# Patient Record
Sex: FEMALE | Race: WHITE | NOT HISPANIC OR LATINO
[De-identification: names, ages, dates, MRNs, and addresses within clinical notes are randomized per-mention and may not be internally consistent; named-entity substitution may affect disease eponyms.]

---

## 2021-09-22 PROBLEM — Z00.00 ENCOUNTER FOR PREVENTIVE HEALTH EXAMINATION: Status: ACTIVE | Noted: 2021-09-22

## 2021-10-11 ENCOUNTER — NON-APPOINTMENT (OUTPATIENT)
Age: 41
End: 2021-10-11

## 2021-10-21 ENCOUNTER — APPOINTMENT (OUTPATIENT)
Dept: ENDOCRINOLOGY | Facility: CLINIC | Age: 41
End: 2021-10-21
Payer: COMMERCIAL

## 2021-10-21 ENCOUNTER — NON-APPOINTMENT (OUTPATIENT)
Age: 41
End: 2021-10-21

## 2021-10-21 VITALS
HEART RATE: 98 BPM | HEIGHT: 66 IN | SYSTOLIC BLOOD PRESSURE: 129 MMHG | DIASTOLIC BLOOD PRESSURE: 86 MMHG | BODY MASS INDEX: 29.73 KG/M2 | WEIGHT: 185 LBS

## 2021-10-21 DIAGNOSIS — Z78.9 OTHER SPECIFIED HEALTH STATUS: ICD-10-CM

## 2021-10-21 DIAGNOSIS — Z83.3 FAMILY HISTORY OF DIABETES MELLITUS: ICD-10-CM

## 2021-10-21 DIAGNOSIS — E11.9 TYPE 2 DIABETES MELLITUS W/OUT COMPLICATIONS: ICD-10-CM

## 2021-10-21 PROCEDURE — 99204 OFFICE O/P NEW MOD 45 MIN: CPT

## 2021-10-21 RX ORDER — ETHYNODIOL DIACETATE AND ETHINYL ESTRADIOL TABLETS 1 MG-35MCG
1-35 KIT ORAL
Refills: 0 | Status: ACTIVE | COMMUNITY

## 2021-10-22 NOTE — HISTORY OF PRESENT ILLNESS
[FreeTextEntry1] : Diagnosed with diabetes by her PCP in August but has not changed her diet since learning of diagnosis, says she was waiting for this appointment.\par 10 years ago, she was told her sugars were borderline and was recommended to start testing sugars at home but she "freaked out"\par no polyuria, polydipsia, blurry vision, SOB, chest pain, or neuropathy symptoms \par Lost 15 lb\par Eats high carb diet -- bagel every morning, pasta, fruit, pizza\par Father has pre diabetes and paternal aunt and cousins have diabetes \par She has increased her walking.\par has seen ophtho who recommended she be tested for diabetes because she was having corneal abrasions.\par vaccinated for Covid.  declines flu vaccine today, says she is vegetarian and vaccine is made with chicken eggs.\par not planning pregnancy\par Was prescribed metformin in the past but it made her very gassy and she could not take it while working\par on estrogen/progestin pill since age 15.  had very irregular periods.  ? PCOS but never received that diagnosis.\par labs reviewed from 8/21:  A1c 11.3%\par \par Meds:\par Zovia estrogen/progestin pill

## 2021-10-22 NOTE — PHYSICAL EXAM
[Alert] : alert [No Acute Distress] : no acute distress [No Proptosis] : no proptosis [No Lid Lag] : no lid lag [Normal Hearing] : hearing was normal [No LAD] : no lymphadenopathy [Thyroid Not Enlarged] : the thyroid was not enlarged [Clear to Auscultation] : lungs were clear to auscultation bilaterally [Normal S1, S2] : normal S1 and S2 [Regular Rhythm] : with a regular rhythm [No Edema] : no peripheral edema [Pedal Pulses Normal] : the pedal pulses are present [Normal Sensation on Monofilament Testing] : normal sensation on monofilament testing of lower extremities [Normal Affect] : the affect was normal [Normal Mood] : the mood was normal [de-identified] : fingerstick 488 (bagel for breakfast)

## 2021-10-22 NOTE — REASON FOR VISIT
[Initial Evaluation] : an initial evaluation [DM Type 2] : DM Type 2 [FreeTextEntry2] : Dr Josey Gudino

## 2021-10-22 NOTE — CONSULT LETTER
[Dear  ___] : Dear  [unfilled], [Consult Letter:] : I had the pleasure of evaluating your patient, [unfilled]. [Please see my note below.] : Please see my note below. [Consult Closing:] : Thank you very much for allowing me to participate in the care of this patient.  If you have any questions, please do not hesitate to contact me. [Sincerely,] : Sincerely, [FreeTextEntry1] : Ms. Amaya has uncontrolled diabetes.  I started her on metformin ER and Jardiance and recommended that she reduce carbs in her diet.  I gave her referral to nutritionist.  I also set her up with Kat continuous glucose monitor to monitor her sugars (she said she doesn't like needles, would not prick herself).\par  [FreeTextEntry3] : Chasity Martinez MD\par Division of Endocrinology\par [facility]

## 2021-10-22 NOTE — ASSESSMENT
[FreeTextEntry1] : Diabetes, not controlled.  goal A1c  < 7%.  Obesity BMI 30.\par Potential complications of diabetes reviewed (blindness, kidney disease, nerve damage) and importance of glucose control discussed to prevent complications. Refer to nutritionist. Limit carb portions to one type of carb per meal and limiit  carbs to 45g per meal. Foot care discussed.\par Explained that her weight loss is probably due to uncontrolled diabetes/hyperglycemia and this is not a healthy weight loss and she may gain back to her baseline as sugars are normalized.\par Kat 2 sensor placed on L arm.  Video of how to apply sensor viewed.  Glucose goals reviewed:  am/premeal goal < 130 and 2h post meal goal < 170.  \par Start metformin ER 500mg, 1 tab with food/day  and in two weeks, increase to 2 tab/day, if not having GI side effects.  Also will add Jardiance 10mg/day. \par 12 units Lyumjev given in office to treat hyperglycemia.\par \par Hyperlipidemia.  will recheck lipids once she has made diet changes, though I explained most people with diabetes will need cholesterol medication.  But my first goal is to control her sugars\par RTO 2 months

## 2021-12-20 ENCOUNTER — TRANSCRIPTION ENCOUNTER (OUTPATIENT)
Age: 41
End: 2021-12-20

## 2022-02-04 ENCOUNTER — APPOINTMENT (OUTPATIENT)
Dept: ENDOCRINOLOGY | Facility: CLINIC | Age: 42
End: 2022-02-04
Payer: COMMERCIAL

## 2022-02-04 DIAGNOSIS — E66.9 OBESITY, UNSPECIFIED: ICD-10-CM

## 2022-02-04 PROCEDURE — 99213 OFFICE O/P EST LOW 20 MIN: CPT | Mod: 95

## 2022-02-04 NOTE — ASSESSMENT
[FreeTextEntry1] : Diabetes, sugars improved.  goal A1c  < 7%.  Obesity.\par Continue metformin, titrate to 3 tab/day.  Continue Jardiance\par also will add bupropion to reduce food cravings and emotional eating.  Start with 1 tab hs and then increase to BID after 2 weeks, as tolerated\par labs ordered; she will come next week to recheck A1c, B12\par RTO 3 months

## 2022-02-04 NOTE — REASON FOR VISIT
[Home] : at home, [unfilled] , at the time of the visit. [Medical Office: ([facility])___] : at the medical office located in  [Verbal consent obtained from patient] : the patient, [unfilled] [Follow - Up] : a follow-up visit

## 2022-02-04 NOTE — HISTORY OF PRESENT ILLNESS
[FreeTextEntry1] : Sugars are better, 100-180 mostly.   \par Wearing Kat on and off.   right now, glucose is 171 on Kat. \par 7 d avg is 192, 14d avg 192.\par was stress eating due to her job so then was not testing on Kat.\par has appointment to see RD in 2 weeks.  needs better food snack options and how to cook in small kitchen.\par feels more tired and more foggy, and thinks it may be due to Jardiance\par not urinating more than usual.  drinks a lot of water\par exercising little due to pandemic and cold weather.  plans to walk more once weather warms up\par tolerating metformin, no GI side effects\par does not know her weight but will see GYN next week and get weighed\par last eye exam was last May or June\par \par \par Meds:\par metformin ER 500mg bid\par Jardiance 10mg\par Zovia estrogen/progestin pill \par B12, cranberry supplement

## 2022-02-16 LAB
ALBUMIN SERPL ELPH-MCNC: 4.6 G/DL
ALP BLD-CCNC: 54 U/L
ALT SERPL-CCNC: 11 U/L
ANION GAP SERPL CALC-SCNC: 19 MMOL/L
AST SERPL-CCNC: 9 U/L
BILIRUB SERPL-MCNC: 0.3 MG/DL
BUN SERPL-MCNC: 8 MG/DL
CALCIUM SERPL-MCNC: 9.6 MG/DL
CHLORIDE SERPL-SCNC: 103 MMOL/L
CO2 SERPL-SCNC: 20 MMOL/L
CREAT SERPL-MCNC: 0.66 MG/DL
ESTIMATED AVERAGE GLUCOSE: 197 MG/DL
GLUCOSE SERPL-MCNC: 215 MG/DL
HBA1C MFR BLD HPLC: 8.5 %
POTASSIUM SERPL-SCNC: 4.7 MMOL/L
PROT SERPL-MCNC: 7.2 G/DL
SODIUM SERPL-SCNC: 142 MMOL/L
VIT B12 SERPL-MCNC: 1870 PG/ML

## 2022-02-18 ENCOUNTER — APPOINTMENT (OUTPATIENT)
Dept: ENDOCRINOLOGY | Facility: CLINIC | Age: 42
End: 2022-02-18
Payer: COMMERCIAL

## 2022-02-18 PROCEDURE — 97802 MEDICAL NUTRITION INDIV IN: CPT

## 2022-06-06 ENCOUNTER — APPOINTMENT (OUTPATIENT)
Dept: ENDOCRINOLOGY | Facility: CLINIC | Age: 42
End: 2022-06-06
Payer: COMMERCIAL

## 2022-06-06 PROCEDURE — 97803 MED NUTRITION INDIV SUBSEQ: CPT | Mod: 95

## 2022-07-27 ENCOUNTER — APPOINTMENT (OUTPATIENT)
Dept: ENDOCRINOLOGY | Facility: CLINIC | Age: 42
End: 2022-07-27

## 2022-10-13 ENCOUNTER — NON-APPOINTMENT (OUTPATIENT)
Age: 42
End: 2022-10-13

## 2023-02-24 ENCOUNTER — APPOINTMENT (OUTPATIENT)
Dept: ENDOCRINOLOGY | Facility: CLINIC | Age: 43
End: 2023-02-24
Payer: COMMERCIAL

## 2023-02-24 VITALS
WEIGHT: 187 LBS | DIASTOLIC BLOOD PRESSURE: 82 MMHG | BODY MASS INDEX: 30.18 KG/M2 | HEART RATE: 87 BPM | SYSTOLIC BLOOD PRESSURE: 136 MMHG

## 2023-02-24 LAB
GLUCOSE BLDC GLUCOMTR-MCNC: 158
HBA1C MFR BLD HPLC: 8.8

## 2023-02-24 PROCEDURE — 99214 OFFICE O/P EST MOD 30 MIN: CPT | Mod: 25

## 2023-02-24 PROCEDURE — 83036 HEMOGLOBIN GLYCOSYLATED A1C: CPT | Mod: QW

## 2023-02-24 PROCEDURE — 82962 GLUCOSE BLOOD TEST: CPT

## 2023-02-24 RX ORDER — FLASH GLUCOSE SENSOR
KIT MISCELLANEOUS
Qty: 2 | Refills: 5 | Status: DISCONTINUED | COMMUNITY
Start: 2021-10-21 | End: 2023-02-24

## 2023-02-24 RX ORDER — BUPROPION HYDROCHLORIDE 100 MG/1
100 TABLET, FILM COATED, EXTENDED RELEASE ORAL
Qty: 60 | Refills: 5 | Status: DISCONTINUED | COMMUNITY
Start: 2022-02-04 | End: 2023-02-24

## 2023-02-24 NOTE — HISTORY OF PRESENT ILLNESS
[FreeTextEntry1] : last here Feb 2022\par She didn't have job or health insurance for a while.  When she wasn't working, she let herself go, and there were also family issues.   She was off Jardiance for a while, when she didn't have insurance. She restarted Jardiance about 1.5 months ago\par no polyuria, polydipsia, nocturia\par she has some neuropathy symptoms at night, but this has resolved\par she has eye exam scheduled for next week\par A1c 8.8% today.  She hasn't been testing sugars because Kat was too expensive.  She has another meter but does not remember the name\par \par Meds:\par metformin ER 500mg bid\par Jardiance 10mg\par Zovia estrogen/progestin pill \par B12, cranberry supplement

## 2023-02-24 NOTE — DATA REVIEWED
[FreeTextEntry1] : 2/23 A1c 8.8% POC\par 2/22  A1c 8.5%\par 8/21: A1c 11.3% tot chol 222, HDL 41, . trig 169, B12 195, TSH 3.48

## 2023-02-24 NOTE — PHYSICAL EXAM
[Alert] : alert [No Acute Distress] : no acute distress [Normal Affect] : the affect was normal [Normal Mood] : the mood was normal [de-identified] : fingerstick 158 (yogurt)

## 2023-02-24 NOTE — ASSESSMENT
[FreeTextEntry1] : Diabetes, not controlled.  goal A1c  < 7%.  Obesity BMI 30.\par Continue metformin and Jardiance.  She thinks higher dose of metformin gave her GI symptoms, in the past.\par I recommended adding Trulicity 0.75mg/week to lower A1c and it may also facilitate weight loss.  Pen device shown, samples given (#2 pens).  Advised to eat slowly to prevent possible nausea and GERD side effects. \par Suggested getting Jacques meter to have supplies shipped to her for $20/month flat fee.\par will get labs next visit\par RTO 3 months

## 2023-03-10 ENCOUNTER — OFFICE (OUTPATIENT)
Dept: URBAN - METROPOLITAN AREA CLINIC 92 | Facility: CLINIC | Age: 43
Setting detail: OPHTHALMOLOGY
End: 2023-03-10
Payer: COMMERCIAL

## 2023-03-10 DIAGNOSIS — H40.013: ICD-10-CM

## 2023-03-10 DIAGNOSIS — H01.001: ICD-10-CM

## 2023-03-10 DIAGNOSIS — H02.403: ICD-10-CM

## 2023-03-10 DIAGNOSIS — H01.004: ICD-10-CM

## 2023-03-10 DIAGNOSIS — H16.223: ICD-10-CM

## 2023-03-10 DIAGNOSIS — E11.3293: ICD-10-CM

## 2023-03-10 DIAGNOSIS — H02.89: ICD-10-CM

## 2023-03-10 DIAGNOSIS — H25.13: ICD-10-CM

## 2023-03-10 DIAGNOSIS — H17.9: ICD-10-CM

## 2023-03-10 PROCEDURE — 92014 COMPRE OPH EXAM EST PT 1/>: CPT | Performed by: SPECIALIST

## 2023-03-10 PROCEDURE — 92083 EXTENDED VISUAL FIELD XM: CPT | Performed by: SPECIALIST

## 2023-03-10 ASSESSMENT — REFRACTION_CURRENTRX
OD_VPRISM_DIRECTION: SV
OS_SPHERE: -0.75
OD_CYLINDER: +1.25
OD_CYLINDER: -1.25
OS_AXIS: 140
OD_OVR_VA: 20/
OS_SPHERE: -1.25
OD_SPHERE: -1.25
OS_CYLINDER: +0.50
OS_AXIS: 010
OD_SPHERE: -2.50
OS_VPRISM_DIRECTION: SV
OS_CYLINDER: -1.00
OD_AXIS: 177
OD_OVR_VA: 20/
OD_AXIS: 088
OS_OVR_VA: 20/
OS_OVR_VA: 20/

## 2023-03-10 ASSESSMENT — REFRACTION_MANIFEST
OS_SPHERE: -1.25
OS_VA1: 20/20
OD_SPHERE: -2.50
OD_VA1: 20/20
OD_SPHERE: -2.50
OS_CYLINDER: +0.50
OD_VA1: 20/20
OD_CYLINDER: +1.25
OS_AXIS: 140
OD_CYLINDER: +1.25
OD_AXIS: 088
OS_CYLINDER: +0.50
OD_AXIS: 088
OS_AXIS: 140
OS_VA1: 20/20
OS_SPHERE: -1.25

## 2023-03-10 ASSESSMENT — AXIALLENGTH_DERIVED
OD_AL: 24.6427
OS_AL: 24.2763
OD_AL: 24.6427
OD_AL: 24.7494
OS_AL: 24.2763
OS_AL: 24.432

## 2023-03-10 ASSESSMENT — KERATOMETRY
OD_AXISANGLE_DEGREES: 083
METHOD_AUTO_MANUAL: AUTO
OS_AXISANGLE_DEGREES: 092
OS_K2POWER_DIOPTERS: 43.50
OD_K2POWER_DIOPTERS: 43.75
OS_K1POWER_DIOPTERS: 42.00
OD_K1POWER_DIOPTERS: 41.75

## 2023-03-10 ASSESSMENT — REFRACTION_AUTOREFRACTION
OS_CYLINDER: +0.75
OS_AXIS: 122
OD_AXIS: 083
OD_SPHERE: -3.00
OD_CYLINDER: +1.75
OS_SPHERE: -1.75

## 2023-03-10 ASSESSMENT — LID EXAM ASSESSMENTS
OS_MEIBOMITIS: 1+ 2+
OS_MRD1: 1 MM
OS_BLEPHARITIS: LUL 1+ 2+
OD_BLEPHARITIS: RUL T 1+
OD_MEIBOMITIS: 1+ 2+
OD_MRD1: 4 MM

## 2023-03-10 ASSESSMENT — SPHEQUIV_DERIVED
OD_SPHEQUIV: -1.875
OS_SPHEQUIV: -1
OS_SPHEQUIV: -1
OD_SPHEQUIV: -1.875
OS_SPHEQUIV: -1.375
OD_SPHEQUIV: -2.125

## 2023-03-10 ASSESSMENT — SUPERFICIAL PUNCTATE KERATITIS (SPK)
OD_SPK: T
OS_SPK: T

## 2023-03-10 ASSESSMENT — CORNEAL SURGICAL SCARRING: OS_SCARRING: ANTERIOR STROMAL

## 2023-03-10 ASSESSMENT — LID POSITION - PTOSIS
OD_PTOSIS: RUL
OS_PTOSIS: LUL

## 2023-03-10 ASSESSMENT — CONFRONTATIONAL VISUAL FIELD TEST (CVF)
OS_FINDINGS: FULL
OD_FINDINGS: FULL

## 2023-03-10 ASSESSMENT — VISUAL ACUITY
OD_BCVA: 20/20-2
OS_BCVA: 20/20

## 2023-06-09 ENCOUNTER — APPOINTMENT (OUTPATIENT)
Dept: ENDOCRINOLOGY | Facility: CLINIC | Age: 43
End: 2023-06-09
Payer: COMMERCIAL

## 2023-06-09 VITALS
SYSTOLIC BLOOD PRESSURE: 121 MMHG | DIASTOLIC BLOOD PRESSURE: 76 MMHG | HEART RATE: 89 BPM | WEIGHT: 189 LBS | BODY MASS INDEX: 30.51 KG/M2

## 2023-06-09 LAB
GLUCOSE BLDC GLUCOMTR-MCNC: 154
HBA1C MFR BLD HPLC: 8.5

## 2023-06-09 PROCEDURE — 82962 GLUCOSE BLOOD TEST: CPT

## 2023-06-09 PROCEDURE — 99214 OFFICE O/P EST MOD 30 MIN: CPT | Mod: 25

## 2023-06-09 PROCEDURE — 83036 HEMOGLOBIN GLYCOSYLATED A1C: CPT | Mod: NC,QW

## 2023-06-09 RX ORDER — DULAGLUTIDE 0.75 MG/.5ML
0.75 INJECTION, SOLUTION SUBCUTANEOUS
Qty: 2 | Refills: 5 | Status: DISCONTINUED | COMMUNITY
Start: 2023-02-24 | End: 2023-06-09

## 2023-06-09 NOTE — DATA REVIEWED
[FreeTextEntry1] : 6/23 A1c 8.5% POC\par 3/23 (pt phone)  A1c 8.9%,  tot chol 216, HDL 49, trig 131,, 24hr urine alb 21.1, B12 450\par 2/23 A1c 8.8% POC\par 2/22  A1c 8.5%\par 8/21: A1c 11.3% tot chol 222, HDL 41, . trig 169, B12 195, TSH 3.48

## 2023-06-09 NOTE — ASSESSMENT
[FreeTextEntry1] : Diabetes, suboptimal.  goal A1c  < 7%.  Obesity BMI 30.\par Titrate Jardiance to 25mg/day and metformin ER to 750mg BID.\par Will add Rybelsus 3mg/day, samples given #30.  Instructions given to take on empty stomach, with 4oz of water and delay eating by 30 minutes.  Advised to eat slowly to prevent potential nausea and GERD side effects.   If she tolerates it, she should contact me to send rx to pharmacy.\par Try to reduce portions, instead of trying to cut out sweets.\par For Jacques meter, she has to sign up for an account, purchase the meter ($35) which attaches to the smartphone.  Download Jacques barbara, and then she can use phone as glucose meter.   Basic subscription ($20/month) will be adequate (provides test strips, and she can test once/day).\par \par Hyperlipidemia.  LDL not at goal but she is also still young so risk of cardiac event it still low.  Will likely recommend adding statin in future, but doesn't have to be now.\par RTO 3 months

## 2023-06-09 NOTE — PHYSICAL EXAM
[Alert] : alert [No Acute Distress] : no acute distress [Normal Affect] : the affect was normal [Normal Mood] : the mood was normal [de-identified] : fingerstick 154
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
## 2023-07-05 ENCOUNTER — NON-APPOINTMENT (OUTPATIENT)
Age: 43
End: 2023-07-05

## 2023-07-05 RX ORDER — ORAL SEMAGLUTIDE 3 MG/1
3 TABLET ORAL
Qty: 30 | Refills: 3 | Status: DISCONTINUED | OUTPATIENT
Start: 2023-06-09 | End: 2023-07-05

## 2023-08-04 ENCOUNTER — APPOINTMENT (OUTPATIENT)
Dept: OBGYN | Facility: CLINIC | Age: 43
End: 2023-08-04
Payer: COMMERCIAL

## 2023-08-04 VITALS
DIASTOLIC BLOOD PRESSURE: 81 MMHG | SYSTOLIC BLOOD PRESSURE: 134 MMHG | HEART RATE: 90 BPM | WEIGHT: 191 LBS | HEIGHT: 66 IN | BODY MASS INDEX: 30.7 KG/M2 | OXYGEN SATURATION: 99 %

## 2023-08-04 DIAGNOSIS — L67.8 OTHER HAIR COLOR AND HAIR SHAFT ABNORMALITIES: ICD-10-CM

## 2023-08-04 DIAGNOSIS — Z01.419 ENCOUNTER FOR GYNECOLOGICAL EXAMINATION (GENERAL) (ROUTINE) W/OUT ABNORMAL FINDINGS: ICD-10-CM

## 2023-08-04 PROCEDURE — 99396 PREV VISIT EST AGE 40-64: CPT

## 2023-08-04 RX ORDER — NORETHINDRONE ACETATE AND ETHINYL ESTRADIOL 1MG-20(24)
1-20 KIT ORAL DAILY
Qty: 3 | Refills: 3 | Status: ACTIVE | COMMUNITY
Start: 2023-08-04 | End: 1900-01-01

## 2023-08-04 NOTE — PLAN
[FreeTextEntry1] : annual; pap and hpv hasn't been s/a in over decade. possibly some vaginismus: was able to do pap today d/w pt pelvic floor Pt option, will hold off for now Dr Josey Rolle for pmd and DR Martinez for endocrine (DM) still working on DM eye issues: recurrent corneal abrasions  did mammogram already this yr, normal per pt

## 2023-08-04 NOTE — PHYSICAL EXAM
[Chaperone Present] : A chaperone was present in the examining room during all aspects of the physical examination [Appropriately responsive] : appropriately responsive [Alert] : alert [No Acute Distress] : no acute distress [No Lymphadenopathy] : no lymphadenopathy [Regular Rate Rhythm] : regular rate rhythm [Clear to Auscultation B/L] : clear to auscultation bilaterally [Soft] : soft [Non-tender] : non-tender [Non-distended] : non-distended [No Mass] : no mass [Oriented x3] : oriented x3 [Examination Of The Breasts] : a normal appearance [No Masses] : no breast masses were palpable [Labia Majora] : normal [Labia Minora] : normal [Normal] : normal [FreeTextEntry6] : hard to examine due to habitus and vaginismus sx

## 2023-08-04 NOTE — HISTORY OF PRESENT ILLNESS
[LMP unknown] : LMP unknown [Y] : Patient uses contraception [Oral Contraceptive] : uses oral contraception pills [N] : Patient denies prior pregnancies [Patient reported mammogram was normal] : Patient reported mammogram was normal [Patient reported breast sonogram was normal] : Patient reported breast sonogram was normal [Regular Cycle Intervals] : periods have been regular [Previously active] : previously active [FreeTextEntry1] : Patient presents for initial office visit without complains.  [Mammogramdate] : 2023 [TextBox_19] : normal per pt, dr sanchez sent for it [PapSmeardate] : ??

## 2023-08-07 LAB — HPV HIGH+LOW RISK DNA PNL CVX: NOT DETECTED

## 2023-08-09 ENCOUNTER — TRANSCRIPTION ENCOUNTER (OUTPATIENT)
Age: 43
End: 2023-08-09

## 2023-08-09 LAB — CYTOLOGY CVX/VAG DOC THIN PREP: NORMAL

## 2023-08-10 ENCOUNTER — OFFICE (OUTPATIENT)
Dept: URBAN - METROPOLITAN AREA CLINIC 92 | Facility: CLINIC | Age: 43
Setting detail: OPHTHALMOLOGY
End: 2023-08-10
Payer: COMMERCIAL

## 2023-08-10 DIAGNOSIS — E11.3213: ICD-10-CM

## 2023-08-10 DIAGNOSIS — E11.3211: ICD-10-CM

## 2023-08-10 PROBLEM — E11.3212 DM TYPE 2; RIGHT MILD WITH ME, LEFT MILD WITH ME: Status: ACTIVE | Noted: 2023-08-10

## 2023-08-10 PROCEDURE — 92134 CPTRZ OPH DX IMG PST SGM RTA: CPT | Performed by: OPHTHALMOLOGY

## 2023-08-10 PROCEDURE — 67210 TREATMENT OF RETINAL LESION: CPT | Performed by: OPHTHALMOLOGY

## 2023-08-10 PROCEDURE — 92235 FLUORESCEIN ANGRPH MLTIFRAME: CPT | Performed by: OPHTHALMOLOGY

## 2023-08-10 PROCEDURE — 92012 INTRM OPH EXAM EST PATIENT: CPT | Performed by: OPHTHALMOLOGY

## 2023-08-10 ASSESSMENT — CORNEAL SURGICAL SCARRING: OS_SCARRING: ANTERIOR STROMAL

## 2023-08-10 ASSESSMENT — LID EXAM ASSESSMENTS
OD_MEIBOMITIS: 1+ 2+
OD_BLEPHARITIS: RUL T 1+
OS_BLEPHARITIS: LUL 1+ 2+
OS_MEIBOMITIS: 1+ 2+
OD_MRD1: 4 MM
OS_MRD1: 1 MM

## 2023-08-10 ASSESSMENT — REFRACTION_AUTOREFRACTION
OD_AXIS: 083
OD_CYLINDER: +1.75
OD_SPHERE: -3.00
OS_CYLINDER: +0.75
OS_AXIS: 122
OS_SPHERE: -1.75

## 2023-08-10 ASSESSMENT — VISUAL ACUITY
OS_BCVA: 20/20
OD_BCVA: 20/25+

## 2023-08-10 ASSESSMENT — KERATOMETRY
OD_AXISANGLE_DEGREES: 083
OS_AXISANGLE_DEGREES: 092
METHOD_AUTO_MANUAL: AUTO
OS_K1POWER_DIOPTERS: 42.00
OD_K2POWER_DIOPTERS: 43.75
OD_K1POWER_DIOPTERS: 41.75
OS_K2POWER_DIOPTERS: 43.50

## 2023-08-10 ASSESSMENT — AXIALLENGTH_DERIVED
OS_AL: 24.432
OD_AL: 24.7494

## 2023-08-10 ASSESSMENT — SPHEQUIV_DERIVED
OD_SPHEQUIV: -2.125
OS_SPHEQUIV: -1.375

## 2023-08-10 ASSESSMENT — SUPERFICIAL PUNCTATE KERATITIS (SPK)
OD_SPK: T
OS_SPK: T

## 2023-08-10 ASSESSMENT — CONFRONTATIONAL VISUAL FIELD TEST (CVF)
OD_FINDINGS: FULL
OS_FINDINGS: FULL

## 2023-08-10 ASSESSMENT — LID POSITION - PTOSIS
OD_PTOSIS: RUL
OS_PTOSIS: LUL

## 2023-10-12 ENCOUNTER — OFFICE (OUTPATIENT)
Dept: URBAN - METROPOLITAN AREA CLINIC 92 | Facility: CLINIC | Age: 43
Setting detail: OPHTHALMOLOGY
End: 2023-10-12
Payer: COMMERCIAL

## 2023-10-12 DIAGNOSIS — E11.3212: ICD-10-CM

## 2023-10-12 PROCEDURE — 67210 TREATMENT OF RETINAL LESION: CPT | Mod: 79,LT | Performed by: OPHTHALMOLOGY

## 2023-10-12 ASSESSMENT — LID EXAM ASSESSMENTS
OS_BLEPHARITIS: LUL 1+ 2+
OD_BLEPHARITIS: RUL T 1+
OD_MRD1: 4 MM
OS_MEIBOMITIS: 1+ 2+
OD_MEIBOMITIS: 1+ 2+
OS_MRD1: 1 MM

## 2023-10-12 ASSESSMENT — REFRACTION_AUTOREFRACTION
OS_SPHERE: -1.75
OD_CYLINDER: +1.75
OS_CYLINDER: +0.75
OD_SPHERE: -3.00
OS_AXIS: 122
OD_AXIS: 083

## 2023-10-12 ASSESSMENT — KERATOMETRY
OS_K2POWER_DIOPTERS: 43.50
OD_K2POWER_DIOPTERS: 43.75
OD_K1POWER_DIOPTERS: 41.75
OD_AXISANGLE_DEGREES: 083
OS_K1POWER_DIOPTERS: 42.00
OS_AXISANGLE_DEGREES: 092
METHOD_AUTO_MANUAL: AUTO

## 2023-10-12 ASSESSMENT — SPHEQUIV_DERIVED
OD_SPHEQUIV: -2.125
OS_SPHEQUIV: -1.375

## 2023-10-12 ASSESSMENT — AXIALLENGTH_DERIVED
OD_AL: 24.7494
OS_AL: 24.432

## 2023-10-12 ASSESSMENT — SUPERFICIAL PUNCTATE KERATITIS (SPK)
OD_SPK: T
OS_SPK: T

## 2023-10-12 ASSESSMENT — CONFRONTATIONAL VISUAL FIELD TEST (CVF)
OD_FINDINGS: FULL
OS_FINDINGS: FULL

## 2023-10-12 ASSESSMENT — LID POSITION - PTOSIS
OS_PTOSIS: LUL
OD_PTOSIS: RUL

## 2023-10-12 ASSESSMENT — VISUAL ACUITY
OD_BCVA: 20/30+
OS_BCVA: 20/20

## 2023-10-12 ASSESSMENT — CORNEAL SURGICAL SCARRING: OS_SCARRING: ANTERIOR STROMAL

## 2023-10-13 ENCOUNTER — APPOINTMENT (OUTPATIENT)
Dept: ENDOCRINOLOGY | Facility: CLINIC | Age: 43
End: 2023-10-13
Payer: COMMERCIAL

## 2023-10-13 VITALS
SYSTOLIC BLOOD PRESSURE: 136 MMHG | BODY MASS INDEX: 30.99 KG/M2 | DIASTOLIC BLOOD PRESSURE: 79 MMHG | HEART RATE: 93 BPM | WEIGHT: 192 LBS

## 2023-10-13 LAB
GLUCOSE BLDC GLUCOMTR-MCNC: 182
HBA1C MFR BLD HPLC: 9.3

## 2023-10-13 PROCEDURE — 99214 OFFICE O/P EST MOD 30 MIN: CPT | Mod: 25

## 2023-10-13 PROCEDURE — 83036 HEMOGLOBIN GLYCOSYLATED A1C: CPT | Mod: NC,QW

## 2023-10-13 PROCEDURE — 82962 GLUCOSE BLOOD TEST: CPT

## 2024-02-16 ENCOUNTER — APPOINTMENT (OUTPATIENT)
Dept: ENDOCRINOLOGY | Facility: CLINIC | Age: 44
End: 2024-02-16
Payer: COMMERCIAL

## 2024-02-16 VITALS
DIASTOLIC BLOOD PRESSURE: 84 MMHG | SYSTOLIC BLOOD PRESSURE: 145 MMHG | WEIGHT: 179 LBS | HEART RATE: 94 BPM | BODY MASS INDEX: 28.89 KG/M2

## 2024-02-16 LAB
GLUCOSE BLDC GLUCOMTR-MCNC: 158
HBA1C MFR BLD HPLC: 7.1

## 2024-02-16 PROCEDURE — 82962 GLUCOSE BLOOD TEST: CPT

## 2024-02-16 PROCEDURE — 83036 HEMOGLOBIN GLYCOSYLATED A1C: CPT | Mod: QW

## 2024-02-16 PROCEDURE — 99214 OFFICE O/P EST MOD 30 MIN: CPT | Mod: 25

## 2024-02-16 RX ORDER — METFORMIN ER 500 MG 500 MG/1
500 TABLET ORAL
Qty: 180 | Refills: 1 | Status: ACTIVE | COMMUNITY
Start: 2021-10-21 | End: 1900-01-01

## 2024-02-16 RX ORDER — TIRZEPATIDE 5 MG/.5ML
5 INJECTION, SOLUTION SUBCUTANEOUS
Qty: 4 | Refills: 5 | Status: ACTIVE | COMMUNITY
Start: 2023-11-27 | End: 1900-01-01

## 2024-02-16 RX ORDER — EMPAGLIFLOZIN 25 MG/1
25 TABLET, FILM COATED ORAL
Qty: 90 | Refills: 1 | Status: ACTIVE | COMMUNITY
Start: 2021-10-21 | End: 1900-01-01

## 2024-02-16 NOTE — PHYSICAL EXAM
[Alert] : alert [No Acute Distress] : no acute distress [Normal Affect] : the affect was normal [Normal Mood] : the mood was normal [de-identified] : glucose 158, fasting.  had grapefruit juice at night

## 2024-02-16 NOTE — ASSESSMENT
[FreeTextEntry1] : Diabetes with retinopathy.  Much improved, nearly at goal now.  Goal A1c < 7.0% Continue Mounjaro 5mg/week,  Jardiance 25mg/day and metformin 500mg bid. Continue regular exercise. Advised pt to avoid sugared drinks all the time (OJ does not help for colds), unless treating hypoglycemia (but her current diabetes regimen should not cause hypoglycemia). labs today RTO 5 months

## 2024-02-16 NOTE — HISTORY OF PRESENT ILLNESS
[FreeTextEntry1] : Not eating as much on Mounjaro.   weight is down 13 lb since last visit in October. She has zero appetite after injecting and has zero energy also, after injecting.  She skip a dose if she knows she will be doing a lot of physical activity the next day. Sugar is high this morning (158) because she drank grapefruit juice last night for a head cold.  She drinks it for vitamin C, when she is sick.   The last time she had it was in November when she had a cold. Recent readings from Jacques meter: 131 (8a), 126 (9p), 115 (10a), 116 (10a), 140 (7p).  average 135. She started dance classes, 2x/month, this year. she walks 8000 steps/day, 5x/week. she sees opho regularly (every 3 months). Last  appointment was rescheduled by opho.  She is getting laser for DR. A1c today is 7.1%, much improved.  Meds: metformin ER 500mg bid Jardiance 25 mg , Mounjaro 5mg/week Zovia estrogen/progestin pill  B12, cranberry supplement Previously tried:  Trulicity (stomach pain), Rybelsus (stomach pain)

## 2024-03-14 ENCOUNTER — OFFICE (OUTPATIENT)
Dept: URBAN - METROPOLITAN AREA CLINIC 92 | Facility: CLINIC | Age: 44
Setting detail: OPHTHALMOLOGY
End: 2024-03-14
Payer: COMMERCIAL

## 2024-03-14 DIAGNOSIS — E11.3211: ICD-10-CM

## 2024-03-14 DIAGNOSIS — E11.3213: ICD-10-CM

## 2024-03-14 PROCEDURE — 67210 TREATMENT OF RETINAL LESION: CPT | Mod: RT | Performed by: OPHTHALMOLOGY

## 2024-03-14 PROCEDURE — 92134 CPTRZ OPH DX IMG PST SGM RTA: CPT | Performed by: OPHTHALMOLOGY

## 2024-03-25 ASSESSMENT — LID POSITION - PTOSIS
OS_PTOSIS: LUL
OD_PTOSIS: RUL

## 2024-03-25 ASSESSMENT — LID EXAM ASSESSMENTS
OS_BLEPHARITIS: LUL 1+ 2+
OD_MEIBOMITIS: 1+ 2+
OS_MEIBOMITIS: 1+ 2+
OD_MRD1: 4 MM
OS_MRD1: 1 MM
OD_BLEPHARITIS: RUL T 1+

## 2024-04-05 ENCOUNTER — OFFICE (OUTPATIENT)
Dept: URBAN - METROPOLITAN AREA CLINIC 92 | Facility: CLINIC | Age: 44
Setting detail: OPHTHALMOLOGY
End: 2024-04-05
Payer: COMMERCIAL

## 2024-04-05 DIAGNOSIS — H25.13: ICD-10-CM

## 2024-04-05 DIAGNOSIS — H16.223: ICD-10-CM

## 2024-04-05 DIAGNOSIS — H52.13: ICD-10-CM

## 2024-04-05 DIAGNOSIS — H17.9: ICD-10-CM

## 2024-04-05 DIAGNOSIS — H02.403: ICD-10-CM

## 2024-04-05 DIAGNOSIS — H02.89: ICD-10-CM

## 2024-04-05 DIAGNOSIS — H01.001: ICD-10-CM

## 2024-04-05 DIAGNOSIS — H40.013: ICD-10-CM

## 2024-04-05 DIAGNOSIS — H01.004: ICD-10-CM

## 2024-04-05 PROCEDURE — 92015 DETERMINE REFRACTIVE STATE: CPT | Performed by: SPECIALIST

## 2024-04-05 PROCEDURE — 99024 POSTOP FOLLOW-UP VISIT: CPT | Performed by: SPECIALIST

## 2024-04-05 ASSESSMENT — LID POSITION - PTOSIS
OD_PTOSIS: RUL
OS_PTOSIS: LUL

## 2024-04-05 ASSESSMENT — LID EXAM ASSESSMENTS
OD_MEIBOMITIS: 1+ 2+
OS_MRD1: 2 MM
OS_BLEPHARITIS: LUL 1+ 2+
OS_MEIBOMITIS: 1+ 2+
OD_MRD1: 4 MM
OD_BLEPHARITIS: RUL T 1+

## 2024-04-11 ENCOUNTER — OFFICE (OUTPATIENT)
Dept: URBAN - METROPOLITAN AREA CLINIC 92 | Facility: CLINIC | Age: 44
Setting detail: OPHTHALMOLOGY
End: 2024-04-11
Payer: COMMERCIAL

## 2024-04-11 DIAGNOSIS — E11.3212: ICD-10-CM

## 2024-04-11 PROCEDURE — 67210 TREATMENT OF RETINAL LESION: CPT | Mod: 79,LT | Performed by: OPHTHALMOLOGY

## 2024-04-11 ASSESSMENT — LID EXAM ASSESSMENTS
OD_MEIBOMITIS: 1+ 2+
OD_BLEPHARITIS: RUL T 1+
OS_MRD1: 2 MM
OS_BLEPHARITIS: LUL 1+ 2+
OD_MRD1: 4 MM
OS_MEIBOMITIS: 1+ 2+

## 2024-04-11 ASSESSMENT — LID POSITION - PTOSIS
OS_PTOSIS: LUL
OD_PTOSIS: RUL

## 2024-04-22 RX ORDER — DULAGLUTIDE 1.5 MG/.5ML
1.5 INJECTION, SOLUTION SUBCUTANEOUS
Qty: 2 | Refills: 3 | Status: ACTIVE | COMMUNITY
Start: 2024-04-22 | End: 1900-01-01

## 2024-07-03 ENCOUNTER — RX RENEWAL (OUTPATIENT)
Age: 44
End: 2024-07-03

## 2024-07-03 RX ORDER — NORETHINDRONE ACETATE AND ETHINYL ESTRADIOL, AND FERROUS FUMARATE 1MG-20(24)
1-20 KIT ORAL
Qty: 84 | Refills: 0 | Status: ACTIVE | COMMUNITY
Start: 2024-07-03 | End: 1900-01-01

## 2024-07-11 ENCOUNTER — OFFICE (OUTPATIENT)
Dept: URBAN - METROPOLITAN AREA CLINIC 92 | Facility: CLINIC | Age: 44
Setting detail: OPHTHALMOLOGY
End: 2024-07-11
Payer: COMMERCIAL

## 2024-07-11 DIAGNOSIS — E11.3213: ICD-10-CM

## 2024-07-11 DIAGNOSIS — E11.3211: ICD-10-CM

## 2024-07-11 PROCEDURE — 92012 INTRM OPH EXAM EST PATIENT: CPT | Mod: 57 | Performed by: OPHTHALMOLOGY

## 2024-07-11 PROCEDURE — 92134 CPTRZ OPH DX IMG PST SGM RTA: CPT | Performed by: OPHTHALMOLOGY

## 2024-07-11 PROCEDURE — 67210 TREATMENT OF RETINAL LESION: CPT | Mod: RT | Performed by: OPHTHALMOLOGY

## 2024-07-11 ASSESSMENT — LID EXAM ASSESSMENTS
OS_BLEPHARITIS: LUL 1+ 2+
OD_MEIBOMITIS: 1+ 2+
OS_MRD1: 2 MM
OD_BLEPHARITIS: RUL T 1+
OD_MRD1: 4 MM
OS_MEIBOMITIS: 1+ 2+

## 2024-07-11 ASSESSMENT — LID POSITION - PTOSIS
OD_PTOSIS: RUL
OS_PTOSIS: LUL

## 2024-07-12 ENCOUNTER — APPOINTMENT (OUTPATIENT)
Dept: ENDOCRINOLOGY | Facility: CLINIC | Age: 44
End: 2024-07-12
Payer: COMMERCIAL

## 2024-07-12 VITALS
HEIGHT: 66 IN | DIASTOLIC BLOOD PRESSURE: 78 MMHG | HEART RATE: 91 BPM | BODY MASS INDEX: 29.73 KG/M2 | SYSTOLIC BLOOD PRESSURE: 136 MMHG | WEIGHT: 185 LBS

## 2024-07-12 LAB — GLUCOSE BLDC GLUCOMTR-MCNC: 227

## 2024-07-12 PROCEDURE — 82962 GLUCOSE BLOOD TEST: CPT

## 2024-07-12 PROCEDURE — G2211 COMPLEX E/M VISIT ADD ON: CPT

## 2024-07-12 PROCEDURE — 99214 OFFICE O/P EST MOD 30 MIN: CPT

## 2024-08-08 ENCOUNTER — OFFICE (OUTPATIENT)
Dept: URBAN - METROPOLITAN AREA CLINIC 92 | Facility: CLINIC | Age: 44
Setting detail: OPHTHALMOLOGY
End: 2024-08-08
Payer: COMMERCIAL

## 2024-08-08 DIAGNOSIS — E11.3212: ICD-10-CM

## 2024-08-08 PROCEDURE — 67210 TREATMENT OF RETINAL LESION: CPT | Mod: 79,LT | Performed by: OPHTHALMOLOGY

## 2024-08-08 ASSESSMENT — CONFRONTATIONAL VISUAL FIELD TEST (CVF)
OD_FINDINGS: FULL
OS_FINDINGS: FULL

## 2024-08-08 ASSESSMENT — LID EXAM ASSESSMENTS
OD_MEIBOMITIS: 1+ 2+
OD_BLEPHARITIS: RUL T 1+
OS_MRD1: 2 MM
OS_BLEPHARITIS: LUL 1+ 2+
OS_MEIBOMITIS: 1+ 2+
OD_MRD1: 4 MM

## 2024-08-08 ASSESSMENT — LID POSITION - PTOSIS
OD_PTOSIS: RUL
OS_PTOSIS: LUL

## 2024-08-09 ENCOUNTER — APPOINTMENT (OUTPATIENT)
Dept: OBGYN | Facility: CLINIC | Age: 44
End: 2024-08-09

## 2024-10-07 ENCOUNTER — RX RENEWAL (OUTPATIENT)
Age: 44
End: 2024-10-07

## 2024-11-14 ENCOUNTER — OFFICE (OUTPATIENT)
Dept: URBAN - METROPOLITAN AREA CLINIC 92 | Facility: CLINIC | Age: 44
Setting detail: OPHTHALMOLOGY
End: 2024-11-14
Payer: COMMERCIAL

## 2024-11-14 DIAGNOSIS — E11.3212: ICD-10-CM

## 2024-11-14 DIAGNOSIS — E11.3211: ICD-10-CM

## 2024-11-14 PROCEDURE — 92012 INTRM OPH EXAM EST PATIENT: CPT | Mod: 57 | Performed by: OPHTHALMOLOGY

## 2024-11-14 PROCEDURE — 92134 CPTRZ OPH DX IMG PST SGM RTA: CPT | Performed by: OPHTHALMOLOGY

## 2024-11-14 PROCEDURE — 67210 TREATMENT OF RETINAL LESION: CPT | Mod: RT | Performed by: OPHTHALMOLOGY

## 2024-11-14 ASSESSMENT — REFRACTION_AUTOREFRACTION
OS_CYLINDER: +0.75
OD_CYLINDER: +1.75
OS_SPHERE: -2.25
OD_SPHERE: -3.25
OS_AXIS: 117
OD_AXIS: 84

## 2024-11-14 ASSESSMENT — KERATOMETRY
OD_K1POWER_DIOPTERS: 41.75
OD_K2POWER_DIOPTERS: 43.75
OD_AXISANGLE_DEGREES: 83
OS_K2POWER_DIOPTERS: 43.50
METHOD_AUTO_MANUAL: AUTO
OS_AXISANGLE_DEGREES: 95
OS_K1POWER_DIOPTERS: 41.75

## 2024-11-14 ASSESSMENT — LID EXAM ASSESSMENTS
OD_MEIBOMITIS: 1+ 2+
OS_BLEPHARITIS: LUL 1+ 2+
OS_MEIBOMITIS: 1+ 2+
OD_BLEPHARITIS: RUL T 1+
OD_MRD1: 4 MM
OS_MRD1: 2 MM

## 2024-11-14 ASSESSMENT — LID POSITION - PTOSIS
OS_PTOSIS: LUL
OD_PTOSIS: RUL

## 2024-11-14 ASSESSMENT — TEAR BREAK UP TIME (TBUT)
OS_TBUT: 5 SEC
OD_TBUT: 8 SEC

## 2024-11-14 ASSESSMENT — CORNEAL SURGICAL SCARRING: OS_SCARRING: ANTERIOR STROMAL

## 2024-11-14 ASSESSMENT — CONFRONTATIONAL VISUAL FIELD TEST (CVF)
OD_FINDINGS: FULL
OS_FINDINGS: FULL

## 2024-11-14 ASSESSMENT — SUPERFICIAL PUNCTATE KERATITIS (SPK)
OD_SPK: T
OS_SPK: T

## 2024-11-14 ASSESSMENT — VISUAL ACUITY
OD_BCVA: 20/25
OS_BCVA: 20/25

## 2024-11-15 ENCOUNTER — APPOINTMENT (OUTPATIENT)
Dept: ENDOCRINOLOGY | Facility: CLINIC | Age: 44
End: 2024-11-15
Payer: COMMERCIAL

## 2024-11-15 VITALS
HEIGHT: 66 IN | WEIGHT: 182 LBS | SYSTOLIC BLOOD PRESSURE: 127 MMHG | BODY MASS INDEX: 29.25 KG/M2 | HEART RATE: 77 BPM | DIASTOLIC BLOOD PRESSURE: 77 MMHG

## 2024-11-15 LAB
GLUCOSE BLDC GLUCOMTR-MCNC: 91
HBA1C MFR BLD HPLC: 6.9

## 2024-11-15 PROCEDURE — 83036 HEMOGLOBIN GLYCOSYLATED A1C: CPT | Mod: QW

## 2024-11-15 PROCEDURE — 99213 OFFICE O/P EST LOW 20 MIN: CPT | Mod: 25

## 2024-11-15 PROCEDURE — 82962 GLUCOSE BLOOD TEST: CPT

## 2024-11-15 RX ORDER — TIRZEPATIDE 5 MG/.5ML
5 INJECTION, SOLUTION SUBCUTANEOUS
Qty: 4 | Refills: 5 | Status: ACTIVE | COMMUNITY
Start: 2024-11-15 | End: 1900-01-01

## 2024-12-05 ENCOUNTER — OFFICE (OUTPATIENT)
Dept: URBAN - METROPOLITAN AREA CLINIC 92 | Facility: CLINIC | Age: 44
Setting detail: OPHTHALMOLOGY
End: 2024-12-05
Payer: COMMERCIAL

## 2024-12-05 DIAGNOSIS — E11.3212: ICD-10-CM

## 2024-12-05 PROCEDURE — 67210 TREATMENT OF RETINAL LESION: CPT | Mod: 79,LT | Performed by: OPHTHALMOLOGY

## 2024-12-05 ASSESSMENT — LID EXAM ASSESSMENTS
OD_MRD1: 4 MM
OS_MRD1: 2 MM
OS_MEIBOMITIS: 1+ 2+
OS_BLEPHARITIS: LUL 1+ 2+
OD_MEIBOMITIS: 1+ 2+
OD_BLEPHARITIS: RUL T 1+

## 2024-12-05 ASSESSMENT — CONFRONTATIONAL VISUAL FIELD TEST (CVF)
OS_FINDINGS: FULL
OD_FINDINGS: FULL

## 2024-12-05 ASSESSMENT — TEAR BREAK UP TIME (TBUT)
OS_TBUT: 5 SEC
OD_TBUT: 8 SEC

## 2024-12-05 ASSESSMENT — REFRACTION_AUTOREFRACTION
OD_AXIS: 84
OD_SPHERE: -3.25
OS_AXIS: 117
OS_SPHERE: -2.25
OD_CYLINDER: +1.75
OS_CYLINDER: +0.75

## 2024-12-05 ASSESSMENT — VISUAL ACUITY
OS_BCVA: 20/20-2
OD_BCVA: 20/30

## 2024-12-05 ASSESSMENT — KERATOMETRY
OD_AXISANGLE_DEGREES: 83
OS_K2POWER_DIOPTERS: 43.50
OS_K1POWER_DIOPTERS: 41.75
OD_K1POWER_DIOPTERS: 41.75
OD_K2POWER_DIOPTERS: 43.75
METHOD_AUTO_MANUAL: AUTO
OS_AXISANGLE_DEGREES: 95

## 2024-12-05 ASSESSMENT — SUPERFICIAL PUNCTATE KERATITIS (SPK)
OS_SPK: T
OD_SPK: T

## 2024-12-05 ASSESSMENT — LID POSITION - PTOSIS
OS_PTOSIS: LUL
OD_PTOSIS: RUL

## 2024-12-05 ASSESSMENT — CORNEAL SURGICAL SCARRING: OS_SCARRING: ANTERIOR STROMAL
